# Patient Record
Sex: FEMALE | Race: ASIAN | Employment: UNEMPLOYED | URBAN - METROPOLITAN AREA
[De-identification: names, ages, dates, MRNs, and addresses within clinical notes are randomized per-mention and may not be internally consistent; named-entity substitution may affect disease eponyms.]

---

## 2019-05-23 ENCOUNTER — APPOINTMENT (OUTPATIENT)
Dept: MRI IMAGING | Age: 69
End: 2019-05-23
Attending: INTERNAL MEDICINE
Payer: MEDICARE

## 2019-05-23 ENCOUNTER — APPOINTMENT (OUTPATIENT)
Dept: VASCULAR SURGERY | Age: 69
End: 2019-05-23
Attending: INTERNAL MEDICINE
Payer: MEDICARE

## 2019-05-23 ENCOUNTER — HOSPITAL ENCOUNTER (OUTPATIENT)
Dept: MRI IMAGING | Age: 69
Discharge: HOME | End: 2019-05-23
Attending: INTERNAL MEDICINE
Payer: MEDICARE

## 2019-05-23 ENCOUNTER — APPOINTMENT (OUTPATIENT)
Dept: NON INVASIVE DIAGNOSTICS | Age: 69
End: 2019-05-23
Attending: INTERNAL MEDICINE
Payer: MEDICARE

## 2019-05-23 ENCOUNTER — APPOINTMENT (OUTPATIENT)
Dept: CT IMAGING | Age: 69
End: 2019-05-23
Attending: EMERGENCY MEDICINE
Payer: MEDICARE

## 2019-05-23 ENCOUNTER — HOSPITAL ENCOUNTER (OUTPATIENT)
Age: 69
Setting detail: OBSERVATION
Discharge: HOME | End: 2019-05-24
Attending: EMERGENCY MEDICINE | Admitting: INTERNAL MEDICINE
Payer: MEDICARE

## 2019-05-23 DIAGNOSIS — I16.0 HYPERTENSIVE URGENCY: Primary | ICD-10-CM

## 2019-05-23 DIAGNOSIS — G45.9 TIA (TRANSIENT ISCHEMIC ATTACK): ICD-10-CM

## 2019-05-23 PROBLEM — F32.A DEPRESSION: Status: ACTIVE | Noted: 2019-05-23

## 2019-05-23 PROBLEM — K21.9 GERD (GASTROESOPHAGEAL REFLUX DISEASE): Status: ACTIVE | Noted: 2019-05-23

## 2019-05-23 PROBLEM — I16.9 HYPERTENSIVE CRISIS: Status: ACTIVE | Noted: 2019-05-23

## 2019-05-23 PROBLEM — R47.89 WORD FINDING DIFFICULTY: Status: ACTIVE | Noted: 2019-05-23

## 2019-05-23 LAB
ALBUMIN SERPL-MCNC: 3.6 G/DL (ref 3.5–5)
ALBUMIN/GLOB SERPL: 0.8 {RATIO} (ref 1.1–2.2)
ALP SERPL-CCNC: 107 U/L (ref 45–117)
ALT SERPL-CCNC: 27 U/L (ref 12–78)
ANION GAP SERPL CALC-SCNC: 4 MMOL/L (ref 5–15)
APPEARANCE UR: CLEAR
AST SERPL-CCNC: 21 U/L (ref 15–37)
ATRIAL RATE: 83 BPM
BACTERIA URNS QL MICRO: NEGATIVE /HPF
BASOPHILS # BLD: 0 K/UL (ref 0–0.1)
BASOPHILS NFR BLD: 1 % (ref 0–1)
BILIRUB SERPL-MCNC: 0.3 MG/DL (ref 0.2–1)
BILIRUB UR QL: NEGATIVE
BUN SERPL-MCNC: 12 MG/DL (ref 6–20)
BUN/CREAT SERPL: 13 (ref 12–20)
CALCIUM SERPL-MCNC: 8.8 MG/DL (ref 8.5–10.1)
CALCULATED P AXIS, ECG09: 46 DEGREES
CALCULATED R AXIS, ECG10: -32 DEGREES
CALCULATED T AXIS, ECG11: 33 DEGREES
CHLORIDE SERPL-SCNC: 105 MMOL/L (ref 97–108)
CO2 SERPL-SCNC: 26 MMOL/L (ref 21–32)
COLOR UR: NORMAL
COMMENT, HOLDF: NORMAL
CREAT SERPL-MCNC: 0.91 MG/DL (ref 0.55–1.02)
DIAGNOSIS, 93000: NORMAL
DIFFERENTIAL METHOD BLD: NORMAL
ECHO AO ASC DIAM: 4.15 CM
ECHO AO ROOT DIAM: 3.24 CM
ECHO AV PEAK GRADIENT: 8 MMHG
ECHO AV PEAK VELOCITY: 141.78 CM/S
ECHO IVC SNIFF: 0.89 CM
ECHO LA AREA 4C: 12.2 CM2
ECHO LA MAJOR AXIS: 2.89 CM
ECHO LA TO AORTIC ROOT RATIO: 0.89
ECHO LA VOL 2C: 21.35 ML (ref 22–52)
ECHO LA VOL 4C: 24.57 ML (ref 22–52)
ECHO LA VOL BP: 25.6 ML (ref 22–52)
ECHO LA VOL/BSA BIPLANE: 13.63 ML/M2 (ref 16–28)
ECHO LA VOLUME INDEX A2C: 11.37 ML/M2 (ref 16–28)
ECHO LA VOLUME INDEX A4C: 13.08 ML/M2 (ref 16–28)
ECHO LV E' LATERAL VELOCITY: 7.07 CENTIMETER/SECOND
ECHO LV E' SEPTAL VELOCITY: 5.57 CENTIMETER/SECOND
ECHO LV INTERNAL DIMENSION DIASTOLIC: 3.82 CM (ref 3.9–5.3)
ECHO LV INTERNAL DIMENSION SYSTOLIC: 2.35 CM
ECHO LV IVSD: 1.1 CM (ref 0.6–0.9)
ECHO LV MASS 2D: 139.9 G (ref 67–162)
ECHO LV MASS INDEX 2D: 74.5 G/M2 (ref 43–95)
ECHO LV POSTERIOR WALL DIASTOLIC: 0.96 CM (ref 0.6–0.9)
ECHO LVOT DIAM: 1.99 CM
ECHO MV A VELOCITY: 71 CM/S
ECHO MV AREA PHT: 2.6 CM2
ECHO MV E DECELERATION TIME (DT): 292.7 MS
ECHO MV E VELOCITY: 48.26 CM/S
ECHO MV E/A RATIO: 0.68
ECHO MV PRESSURE HALF TIME (PHT): 84.9 MS
ECHO PV MAX VELOCITY: 58.06 CM/S
ECHO PV PEAK GRADIENT: 1.3 MMHG
ECHO RV INTERNAL DIMENSION: 3.13 CM
ECHO RV TAPSE: 1.36 CM (ref 1.5–2)
ECHO TV REGURGITANT MAX VELOCITY: 274.81 CM/S
ECHO TV REGURGITANT PEAK GRADIENT: 30.2 MMHG
EOSINOPHIL # BLD: 0.2 K/UL (ref 0–0.4)
EOSINOPHIL NFR BLD: 2 % (ref 0–7)
EPITH CASTS URNS QL MICRO: NORMAL /LPF
ERYTHROCYTE [DISTWIDTH] IN BLOOD BY AUTOMATED COUNT: 13 % (ref 11.5–14.5)
GLOBULIN SER CALC-MCNC: 4.7 G/DL (ref 2–4)
GLUCOSE BLD STRIP.AUTO-MCNC: 109 MG/DL (ref 65–100)
GLUCOSE SERPL-MCNC: 107 MG/DL (ref 65–100)
GLUCOSE UR STRIP.AUTO-MCNC: NEGATIVE MG/DL
HCT VFR BLD AUTO: 44.1 % (ref 35–47)
HGB BLD-MCNC: 14.8 G/DL (ref 11.5–16)
HGB UR QL STRIP: NEGATIVE
HYALINE CASTS URNS QL MICRO: NORMAL /LPF (ref 0–5)
IMM GRANULOCYTES # BLD AUTO: 0 K/UL (ref 0–0.04)
IMM GRANULOCYTES NFR BLD AUTO: 0 % (ref 0–0.5)
KETONES UR QL STRIP.AUTO: NEGATIVE MG/DL
LEUKOCYTE ESTERASE UR QL STRIP.AUTO: NEGATIVE
LYMPHOCYTES # BLD: 2 K/UL (ref 0.8–3.5)
LYMPHOCYTES NFR BLD: 31 % (ref 12–49)
MCH RBC QN AUTO: 29.9 PG (ref 26–34)
MCHC RBC AUTO-ENTMCNC: 33.6 G/DL (ref 30–36.5)
MCV RBC AUTO: 89.1 FL (ref 80–99)
MONOCYTES # BLD: 0.4 K/UL (ref 0–1)
MONOCYTES NFR BLD: 6 % (ref 5–13)
NEUTS SEG # BLD: 3.9 K/UL (ref 1.8–8)
NEUTS SEG NFR BLD: 60 % (ref 32–75)
NITRITE UR QL STRIP.AUTO: NEGATIVE
NRBC # BLD: 0 K/UL (ref 0–0.01)
NRBC BLD-RTO: 0 PER 100 WBC
P-R INTERVAL, ECG05: 162 MS
PH UR STRIP: 6 [PH] (ref 5–8)
PLATELET # BLD AUTO: 211 K/UL (ref 150–400)
PMV BLD AUTO: 9.8 FL (ref 8.9–12.9)
POTASSIUM SERPL-SCNC: 4.4 MMOL/L (ref 3.5–5.1)
PROT SERPL-MCNC: 8.3 G/DL (ref 6.4–8.2)
PROT UR STRIP-MCNC: NEGATIVE MG/DL
Q-T INTERVAL, ECG07: 388 MS
QRS DURATION, ECG06: 82 MS
QTC CALCULATION (BEZET), ECG08: 455 MS
RBC # BLD AUTO: 4.95 M/UL (ref 3.8–5.2)
RBC #/AREA URNS HPF: NORMAL /HPF (ref 0–5)
SAMPLES BEING HELD,HOLD: NORMAL
SERVICE CMNT-IMP: ABNORMAL
SODIUM SERPL-SCNC: 135 MMOL/L (ref 136–145)
SP GR UR REFRACTOMETRY: 1.01 (ref 1–1.03)
TROPONIN I SERPL-MCNC: <0.05 NG/ML
UR CULT HOLD, URHOLD: NORMAL
UROBILINOGEN UR QL STRIP.AUTO: 0.2 EU/DL (ref 0.2–1)
VENTRICULAR RATE, ECG03: 83 BPM
WBC # BLD AUTO: 6.5 K/UL (ref 3.6–11)
WBC URNS QL MICRO: NORMAL /HPF (ref 0–4)

## 2019-05-23 PROCEDURE — 96366 THER/PROPH/DIAG IV INF ADDON: CPT

## 2019-05-23 PROCEDURE — 99218 HC RM OBSERVATION: CPT

## 2019-05-23 PROCEDURE — 70450 CT HEAD/BRAIN W/O DYE: CPT

## 2019-05-23 PROCEDURE — 97530 THERAPEUTIC ACTIVITIES: CPT

## 2019-05-23 PROCEDURE — 74011000250 HC RX REV CODE- 250: Performed by: INTERNAL MEDICINE

## 2019-05-23 PROCEDURE — 93306 TTE W/DOPPLER COMPLETE: CPT

## 2019-05-23 PROCEDURE — 74011250637 HC RX REV CODE- 250/637: Performed by: EMERGENCY MEDICINE

## 2019-05-23 PROCEDURE — 82962 GLUCOSE BLOOD TEST: CPT

## 2019-05-23 PROCEDURE — 96365 THER/PROPH/DIAG IV INF INIT: CPT

## 2019-05-23 PROCEDURE — 70551 MRI BRAIN STEM W/O DYE: CPT

## 2019-05-23 PROCEDURE — 74011250636 HC RX REV CODE- 250/636: Performed by: EMERGENCY MEDICINE

## 2019-05-23 PROCEDURE — 93880 EXTRACRANIAL BILAT STUDY: CPT

## 2019-05-23 PROCEDURE — 97162 PT EVAL MOD COMPLEX 30 MIN: CPT

## 2019-05-23 PROCEDURE — 74011000258 HC RX REV CODE- 258: Performed by: EMERGENCY MEDICINE

## 2019-05-23 PROCEDURE — 84484 ASSAY OF TROPONIN QUANT: CPT

## 2019-05-23 PROCEDURE — 74011000250 HC RX REV CODE- 250: Performed by: EMERGENCY MEDICINE

## 2019-05-23 PROCEDURE — 74011250636 HC RX REV CODE- 250/636: Performed by: INTERNAL MEDICINE

## 2019-05-23 PROCEDURE — 96375 TX/PRO/DX INJ NEW DRUG ADDON: CPT

## 2019-05-23 PROCEDURE — 36415 COLL VENOUS BLD VENIPUNCTURE: CPT

## 2019-05-23 PROCEDURE — 96374 THER/PROPH/DIAG INJ IV PUSH: CPT

## 2019-05-23 PROCEDURE — 96372 THER/PROPH/DIAG INJ SC/IM: CPT

## 2019-05-23 PROCEDURE — 70544 MR ANGIOGRAPHY HEAD W/O DYE: CPT

## 2019-05-23 PROCEDURE — 80053 COMPREHEN METABOLIC PANEL: CPT

## 2019-05-23 PROCEDURE — 99285 EMERGENCY DEPT VISIT HI MDM: CPT

## 2019-05-23 PROCEDURE — 74011250637 HC RX REV CODE- 250/637: Performed by: INTERNAL MEDICINE

## 2019-05-23 PROCEDURE — 85025 COMPLETE CBC W/AUTO DIFF WBC: CPT

## 2019-05-23 PROCEDURE — 81001 URINALYSIS AUTO W/SCOPE: CPT

## 2019-05-23 PROCEDURE — 93005 ELECTROCARDIOGRAM TRACING: CPT

## 2019-05-23 PROCEDURE — 74011000258 HC RX REV CODE- 258: Performed by: INTERNAL MEDICINE

## 2019-05-23 RX ORDER — ENOXAPARIN SODIUM 100 MG/ML
40 INJECTION SUBCUTANEOUS EVERY 24 HOURS
Status: DISCONTINUED | OUTPATIENT
Start: 2019-05-23 | End: 2019-05-24 | Stop reason: HOSPADM

## 2019-05-23 RX ORDER — NIFEDIPINE 30 MG/1
TABLET, EXTENDED RELEASE ORAL
COMMUNITY
End: 2019-05-23

## 2019-05-23 RX ORDER — LABETALOL HCL 20 MG/4 ML
20 SYRINGE (ML) INTRAVENOUS
Status: COMPLETED | OUTPATIENT
Start: 2019-05-23 | End: 2019-05-23

## 2019-05-23 RX ORDER — NIFEDIPINE 60 MG/1
60 TABLET, EXTENDED RELEASE ORAL DAILY
Status: ON HOLD | COMMUNITY
End: 2019-05-24 | Stop reason: SDUPTHER

## 2019-05-23 RX ORDER — GUAIFENESIN 100 MG/5ML
325 LIQUID (ML) ORAL
Status: COMPLETED | OUTPATIENT
Start: 2019-05-23 | End: 2019-05-23

## 2019-05-23 RX ORDER — GUAIFENESIN 100 MG/5ML
81 LIQUID (ML) ORAL DAILY
Status: DISCONTINUED | OUTPATIENT
Start: 2019-05-24 | End: 2019-05-24 | Stop reason: HOSPADM

## 2019-05-23 RX ORDER — NALOXONE HYDROCHLORIDE 0.4 MG/ML
0.4 INJECTION, SOLUTION INTRAMUSCULAR; INTRAVENOUS; SUBCUTANEOUS AS NEEDED
Status: DISCONTINUED | OUTPATIENT
Start: 2019-05-23 | End: 2019-05-24 | Stop reason: HOSPADM

## 2019-05-23 RX ORDER — SODIUM CHLORIDE 0.9 % (FLUSH) 0.9 %
5-40 SYRINGE (ML) INJECTION AS NEEDED
Status: DISCONTINUED | OUTPATIENT
Start: 2019-05-23 | End: 2019-05-24 | Stop reason: HOSPADM

## 2019-05-23 RX ORDER — LORATADINE 10 MG/1
10 TABLET ORAL
Status: DISCONTINUED | OUTPATIENT
Start: 2019-05-23 | End: 2019-05-24 | Stop reason: HOSPADM

## 2019-05-23 RX ORDER — SODIUM CHLORIDE 0.9 % (FLUSH) 0.9 %
5-40 SYRINGE (ML) INJECTION EVERY 8 HOURS
Status: DISCONTINUED | OUTPATIENT
Start: 2019-05-23 | End: 2019-05-24 | Stop reason: HOSPADM

## 2019-05-23 RX ORDER — NIFEDIPINE 60 MG/1
60 TABLET, EXTENDED RELEASE ORAL DAILY
Status: DISCONTINUED | OUTPATIENT
Start: 2019-05-23 | End: 2019-05-24 | Stop reason: HOSPADM

## 2019-05-23 RX ORDER — LORATADINE 10 MG/1
10 TABLET ORAL
COMMUNITY

## 2019-05-23 RX ORDER — ACETAMINOPHEN 325 MG/1
650 TABLET ORAL
Status: DISCONTINUED | OUTPATIENT
Start: 2019-05-23 | End: 2019-05-24 | Stop reason: HOSPADM

## 2019-05-23 RX ORDER — NIFEDIPINE 60 MG/1
60 TABLET, EXTENDED RELEASE ORAL DAILY
Status: DISCONTINUED | OUTPATIENT
Start: 2019-05-24 | End: 2019-05-23

## 2019-05-23 RX ADMIN — Medication 10 ML: at 22:00

## 2019-05-23 RX ADMIN — ENOXAPARIN SODIUM 40 MG: 40 INJECTION SUBCUTANEOUS at 17:45

## 2019-05-23 RX ADMIN — ASPIRIN 81 MG 324 MG: 81 TABLET ORAL at 11:38

## 2019-05-23 RX ADMIN — Medication 10 ML: at 14:49

## 2019-05-23 RX ADMIN — SODIUM CHLORIDE 0.5 MG/MIN: 900 INJECTION, SOLUTION INTRAVENOUS at 13:20

## 2019-05-23 RX ADMIN — LABETALOL 20 MG/4 ML (5 MG/ML) INTRAVENOUS SYRINGE 20 MG: at 11:39

## 2019-05-23 RX ADMIN — NIFEDIPINE 60 MG: 60 TABLET, FILM COATED, EXTENDED RELEASE ORAL at 15:03

## 2019-05-23 NOTE — PROGRESS NOTES
Problem: Mobility Impaired (Adult and Pediatric)  Goal: *Acute Goals and Plan of Care (Insert Text)  Description  Physical Therapy Goals  Initiated 5/23/2019  1. Patient will move from supine to sit and sit to supine  and scoot up and down in bed with modified independence within 7 day(s). 2.  Patient will transfer from bed to chair and chair to bed with modified independence using the least restrictive device within 7 day(s). 3.  Patient will perform sit to stand with modified independence within 7 day(s). 4.  Patient will ambulate with modified independence for 150 feet with the least restrictive device within 7 day(s). 5.  Patient will ascend/descend 2 stairs with handrail(s) with modified independence within 7 day(s). Outcome: Progressing Towards Goal    PHYSICAL THERAPY EVALUATION  Patient: Madeleine Campoverde (70 y.o. female)  Date: 5/23/2019  Primary Diagnosis: Hypertensive crisis [I16.9]        Precautions: Falls per history       ASSESSMENT :  Based on the objective data described below, the patient presents with generally decreased strength, elevated BP with minimal activity, headache with activity that resolves with rest, and limited functional mobility on day of admission with hypertensive urgency. Pt reports independent baseline mobility with positive fall history although offers only vague details regarding events surrounding falls. She offers good participation with PT and requires SBA for transfers and very brief ambulation to reposition in bed. BP elevated with very little activity and she presents with concurrent headache. RN aware of pt presentation and symptoms. Patient will benefit from skilled intervention to address the above impairments. Patient?s rehabilitation potential is considered to be Good  Factors which may influence rehabilitation potential include:   ? None noted  ? Mental ability/status  ? Medical condition  ?          Home/family situation and support systems  ? Safety awareness  ? Pain tolerance/management  ? Other:      PLAN :  Recommendations and Planned Interventions:  ?           Bed Mobility Training             ? Neuromuscular Re-Education  ? Transfer Training                   ? Orthotic/Prosthetic Training  ? Gait Training                         ? Modalities  ? Therapeutic Exercises           ? Edema Management/Control  ? Therapeutic Activities            ? Patient and Family Training/Education  ? Other (comment):    Frequency/Duration: Patient will be followed by physical therapy  5 times a week to address goals. Discharge Recommendations: Home Health vs none  Further Equipment Recommendations for Discharge: none at this time; TBD with further gait activities. SUBJECTIVE:   Patient stated ? I like to garden. ?    Pt received supine, agreeable to PT and cleared by RN. OBJECTIVE DATA SUMMARY:   HISTORY:    Past Medical History:   Diagnosis Date    Depression    History reviewed. No pertinent surgical history. Prior Level of Function/Home Situation: states independent ambulation at baseline  Personal factors and/or comorbidities impacting plan of care: as above. Home Situation  Home Environment: Private residence  # Steps to Enter: 2  Rails to Enter: Yes  Hand Rails : Bilateral  One/Two Story Residence: One story  Living Alone: No  Support Systems: Spouse/Significant Other/Partner  Patient Expects to be Discharged to[de-identified] Private residence  Current DME Used/Available at Home: None    EXAMINATION/PRESENTATION/DECISION MAKING:   Critical Behavior:  Neurologic State: Alert, Eyes open spontaneously  Orientation Level: Oriented X4  Cognition: Follows commands, Appropriate decision making, Appropriate for age attention/concentration, Appropriate safety awareness     Hearing:   Auditory  Auditory Impairment: None  Skin:  LE exposed skin intact  Edema: none noted LEs. Range Of Motion:  AROM: Within functional limits                       Strength:    Strength: Generally decreased, functional                    Tone & Sensation:   Tone: Normal              Sensation: Intact               Coordination:  Coordination: Within functional limits  Vision:      Functional Mobility:  Bed Mobility:     Supine to Sit: Modified independent  Sit to Supine: Modified independent  Scooting: Modified independent  Transfers:  Sit to Stand: Stand-by assistance  Stand to Sit: Stand-by assistance                       Balance:   Sitting: Without support  Sitting - Static: Good (unsupported)  Sitting - Dynamic: Good (unsupported)  Standing: With support  Standing - Static: Good  Standing - Dynamic : Fair  Ambulation/Gait Training:  Distance (ft): 2 Feet (ft)  Assistive Device: Gait belt  Ambulation - Level of Assistance: Stand-by assistance                                                           Functional Measure:  Barthel Index:    Bathin  Bladder: 10  Bowels: 10  Groomin  Dressing: 10  Feeding: 10  Mobility: 0  Stairs: 0  Toilet Use: 5  Transfer (Bed to Chair and Back): 10  Total: 60/100       Percentage of impairment   0%   1-19%   20-39%   40-59%   60-79%   80-99%   100%   Barthel Score 0-100 100 99-80 79-60 59-40 20-39 1-19   0     The Barthel ADL Index: Guidelines  1. The index should be used as a record of what a patient does, not as a record of what a patient could do. 2. The main aim is to establish degree of independence from any help, physical or verbal, however minor and for whatever reason. 3. The need for supervision renders the patient not independent. 4. A patient's performance should be established using the best available evidence. Asking the patient, friends/relatives and nurses are the usual sources, but direct observation and common sense are also important. However direct testing is not needed.   5. Usually the patient's performance over the preceding 24-48 hours is important, but occasionally longer periods will be relevant. 6. Middle categories imply that the patient supplies over 50 per cent of the effort. 7. Use of aids to be independent is allowed. Carolina Stiles., Barthel, D.W. (7994). Functional evaluation: the Barthel Index. [address] [facility] (14)2. WILLAM Monroe, Zenon Bhagat., [facility] [place], [address] [place] [address] (1999). Measuring the change indisability after inpatient rehabilitation; comparison of the responsiveness of the Barthel Index and Functional Lynn Measure. Journal of Neurology, Neurosurgery, and Psychiatry, 66(4), 845-291. Clarence Cruz, N.J.A, SUKUMAR Gaona, & Francie Agudelo M.A. (2004.) Assessment of post-stroke quality of life in cost-effectiveness studies: The usefulness of the Barthel Index and the EuroQoL-5D. Quality of Life Research, 15, 206-83            Physical Therapy Evaluation Charge Determination   History Examination Presentation Decision-Making   HIGH Complexity :3+ comorbidities / personal factors will impact the outcome/ POC  HIGH Complexity : 4+ Standardized tests and measures addressing body structure, function, activity limitation and / or participation in recreation  MEDIUM Complexity : Evolving with changing characteristics  Other outcome measures barthel  MEDIUM      Based on the above components, the patient evaluation is determined to be of the following complexity level: MEDIUM    Pain:  Pain Scale 1: Numeric (0 - 10)  Pain Intensity 1: 0              Activity Tolerance:   Limited by headache and elevated BP. Please refer to the flowsheet for vital signs taken during this treatment. After treatment:   ?         Patient left in no apparent distress sitting up in chair  ? Patient left in no apparent distress in bed  ? Call bell left within reach  ? Nursing notified  ? Caregiver present  ?          Bed alarm activated    COMMUNICATION/EDUCATION:   The patient?s plan of care was discussed with: Registered Nurse. ?         Fall prevention education was provided and the patient/caregiver indicated understanding. ? Patient/family have participated as able in goal setting and plan of care. ?         Patient/family agree to work toward stated goals and plan of care. ?         Patient understands intent and goals of therapy, but is neutral about his/her participation. ? Patient is unable to participate in goal setting and plan of care.     Thank you for this referral.  Marylu Breaux, PT, DPT   Time Calculation: 21 mins

## 2019-05-23 NOTE — PROGRESS NOTES
Observation letters reviewed with pt,signed by pt,placed in pt,'s chart to be scanned with copies given to pt.   Nathan Natarajan

## 2019-05-23 NOTE — ED PROVIDER NOTES
70 y.o. female with past medical history significant for HTN and depression who presents to the ED via EMS with chief complaint of speech difficulty. Pt reports this morning at approximately 0900 she had speech difficulty, says she \"had problems pronouncing words correctly. \" Pt states her speech difficulty lasted for a few minutes and then resolved. Pt states she also had a headache last night that is now resolved and her blood pressure has been elevated. Pt states she has been taking her antihypertensive medication (nifedipine) as prescribed but has not yet taken it this morning. Upon arrival to the ED, pt's blood pressure is noted to be elevated at 248/128 and she is VAN negative. Pt states she has hx of similar sx \"a long time ago. \" Pt denies weakness or gait problems. There are no other acute medical complaints voiced at this time. Social Hx: Never smoker. Denies EtOH or drug use. PCP: No primary care provider on file. Note written by Dannie Piedra, as dictated by Valentine Ignacio MD 10:50 AM     The history is provided by the patient and the EMS personnel. No  was used. 67y F here with elevated BP. Came in by EMS. Home health care nurse checked it at home and it was high. Hasn't taken meds this morning because she didn't eat breakfast. Denies any symptoms. No focal weakness or numbness. No chest pain. No trouble breathing. Reports having trouble with her speech that started sometime between 9a-10a today. Pt made a level 1 code stroke from triage when I evaluated her. Past Medical History:   Diagnosis Date    Depression        No past surgical history on file.       Family History:   Problem Relation Age of Onset    Heart Disease Father     Heart Disease Mother     Heart Disease Brother        Social History     Socioeconomic History    Marital status: UNKNOWN     Spouse name: Not on file    Number of children: Not on file    Years of education: Not on file    Highest education level: Not on file   Occupational History    Not on file   Social Needs    Financial resource strain: Not on file    Food insecurity:     Worry: Not on file     Inability: Not on file    Transportation needs:     Medical: Not on file     Non-medical: Not on file   Tobacco Use    Smoking status: Never Smoker    Smokeless tobacco: Never Used   Substance and Sexual Activity    Alcohol use: No    Drug use: No    Sexual activity: Not on file     Comment:  & has 3 grown children   Lifestyle    Physical activity:     Days per week: Not on file     Minutes per session: Not on file    Stress: Not on file   Relationships    Social connections:     Talks on phone: Not on file     Gets together: Not on file     Attends [employer] service: Not on file     Active member of club or organization: Not on file     Attends meetings of clubs or organizations: Not on file     Relationship status: Not on file    Intimate partner violence:     Fear of current or ex partner: Not on file     Emotionally abused: Not on file     Physically abused: Not on file     Forced sexual activity: Not on file   Other Topics Concern    Not on file   Social History Narrative    Not on file         ALLERGIES: Patient has no known allergies. Review of Systems   Constitutional: Negative for fever. Eyes: Negative for visual disturbance. Respiratory: Negative for cough, shortness of breath and wheezing. Cardiovascular: Negative for chest pain and leg swelling. Gastrointestinal: Negative for abdominal pain, diarrhea, nausea and vomiting. Genitourinary: Negative for dysuria. Musculoskeletal: Negative. Negative for back pain, gait problem and neck stiffness. Skin: Negative for rash. Neurological: Positive for speech difficulty and headaches (resolved). Negative for syncope and weakness. Psychiatric/Behavioral: Negative for confusion. All other systems reviewed and are negative.       Vitals: 05/23/19 1043 05/23/19 1049   BP: (!) 248/128    Pulse: 91    Resp: 16    Temp: 98.6 °F (37 °C)    SpO2: 97%    Weight: 80.7 kg (178 lb) 80.5 kg (177 lb 7.5 oz)            Physical Exam   Constitutional: She is oriented to person, place, and time. She appears well-developed and well-nourished. No distress. HENT:   Head: Normocephalic. Mouth/Throat: Oropharynx is clear and moist.   Eyes: Pupils are equal, round, and reactive to light. Neck: Normal range of motion. Neck supple. Cardiovascular: Normal rate, regular rhythm and normal heart sounds. No murmur heard. Pulmonary/Chest: Effort normal and breath sounds normal.   Abdominal: Soft. There is no tenderness. Musculoskeletal: Normal range of motion. Neurological: She is alert and oriented to person, place, and time. No cranial nerve deficit or sensory deficit. Coordination normal.   Skin: Skin is warm and dry. Capillary refill takes less than 2 seconds. Psychiatric: She has a normal mood and affect. Her behavior is normal.   Nursing note and vitals reviewed. Note written by Dannie Duke, as dictated by Marlon Sanchez MD 10:51 AM    MDM  Number of Diagnoses or Management Options  Hypertensive urgency:   TIA (transient ischemic attack):      Amount and/or Complexity of Data Reviewed  Clinical lab tests: ordered and reviewed  Obtain history from someone other than the patient: yes  Discuss the patient with other providers: yes  Independent visualization of images, tracings, or specimens: yes    Risk of Complications, Morbidity, and/or Mortality  Presenting problems: high  Diagnostic procedures: high  Management options: high    Critical Care  Total time providing critical care: 30-74 minutes         Procedures    CONSULT NOTE:  11:00 AM Marlon Sanchez MD spoke with Dr. Carlos Dangelo, Consult for Teleneurology. Discussed available diagnostic tests and clinical findings. Dr. Carlos Dangelo will see pt.      ED EKG interpretation:  Rhythm: normal sinus rhythm; and regular . Rate (approx.): 74; Axis: normal; ST/T wave: No acute changes.      Note written by Dannie Burgess, as dictated by Danay Suero MD 11:32 AM       Spoke c dr. Nima Beckford. He will adm

## 2019-05-23 NOTE — PROGRESS NOTES
[facility]: MED RECONCILIATION      Medications removed:  Lisinopril 20mg daily  Omeprazole 20mg daily    Medications adjusted:  Nifedipine- previously 30mg daily. This has not been filled since Nov 2018    Information obtained from: Patient, Karen Walden       Allergies: Patient has no known allergies. Prior to Admission Medications:     Medication Documentation Review Audit       Reviewed by Olinda Antunez, PHARMD (Pharmacist) on 05/23/19 at 1228      Medication Sig Documenting Provider Last Dose Status Taking?   aspirin 81 mg chewable tablet Take 81 mg by mouth daily. Provider, Historical  Active Yes   loratadine (CLARITIN) 10 mg tablet Take 10 mg by mouth daily as needed. Other, MD Mariel  Active Yes   NIFEdipine ER (PROCARDIA XL) 60 mg ER tablet Take 60 mg by mouth daily. Provider, Historical  Active Yes           Med Note (Heidi Cartagena. Thu May 23, 2019 12:26 PM) Last filled Nov 2018 for 30 tabs. Pt is not adherent                    Juanito Melgar
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
## 2019-05-23 NOTE — ED NOTES
Dr. Ryan Kendrick paged and notified of decreased BP, 148/79. Labetalol drip stopped per order. Per Dr. Ryan Kendrick, continue to watch in Step down unit. No further orders or changes at this time.

## 2019-05-23 NOTE — PROGRESS NOTES
Patient passed STAND. Will f/u once she arrives in ICU.  1446: spoke with RN. Currently focus on HTN. She passed the STAND and has no c/o speech issues. Ok for diet per SLP. SLP will f/u in am for medical stability.

## 2019-05-23 NOTE — CONSULTS
RACHEL SECOURS: [id number] [address] Neurology  [place] [address]  [phone]      Name:   Anival Shepherd record #: [id number]  Admission Date: 5/23/2019     Who Consulted: Dr. Tien Weinberg    Reason for Consult:  Slurred Speech    HISTORY OF PRESENT ILLNESS:     This is a 70 y.o. female who is admitted for Slurred Speech. The Neurology Service is asked to evaluate for stroke versus seizure. Ms. Sandro Law presented to the ED after having some trouble with word finding earlier in the day and her care taker found her to be hypertensive. Upon admission she was stroke coded but did not receive TPA as her symptoms had resolved. Her admission Bp was 248/128. Neuro-imaging:     CT Head:  No acute process    EKG: normal EKG, normal sinus rhythm. Care Plan discussed with:  Patient x   Family    RN    Care Manager    Consultant/Specialist:         Thank you for allowing the Neurology Service the pleasure of participating in the care of your patient. This patient will be discussed with my collaborating care team physician,  Dr. True Floyd and he may have further recommendations regarding this patient's care      Genoveva Toribio, ACNP-BC  ====================      Attending Attestation:       I have reviewed the documentation provided by the nurse practitioner, Mrs. Redd Toribio, and we have discussed her findings and the clinical impression. I have formulated with her the proposed management plans for this patient. Additionally,  I have personally evaluated the patient to verify the history and to confirm physical findings. Below are my additional comments:    70-year-old lady admitted to the hospital with slurred speech question stroke and markedly elevated blood pressure on arrival.  MRI of the brain is been performed and shows no acute stroke. MRA of the intracranial circulation however demonstrates questionable significant intracranial stenosis. She is headed to CTA at this point. She has had resolution of symptoms. She denies any recurrent symptoms. On exam she looks well. She is awake alert oriented and conversant. Speech and language are normal.  Cranial nerves intact 2-12. She has no pronation or drift. Strength is full throughout. No ataxia. Question TIA versus symptoms secondary to hypertension. Question significant intracranial stenosis versus artifact. Await CTA. Continue aspirin for now. Continue good blood pressure control. If she does have significant intracranial stenosis would start her on dual antiplatelet therapy as well as optimize lipids and ensuring that her LDL is less than 70 and have her follow-up as an outpatient with Dr. Farzad Arce or Dr. Angel Trujillo MD                         Assessment/Plan:     1. Stroke vs PRES:    · ASA 81 mg  · Will need ASA at discharge  · Neurochecks:  Every 4 hours  · Blood Sugar Goal:  140-180  · BP Goal: SBP should be 180-200 for next 24 hours as admission Bp was approx 200. Do not lower it more than 180 with labetalol, however if it should drift lower on its own do not need to augment. · Telemetry for at least 24 hours    Stroke work up in progress  · A1C:  · LDL:    · TTE:    · MRI:  · Carotid vascular imaging:    Risk factors for stroke include: HTN, physical inactivity  · Discussed with patient    · Discussed signs/symptoms of stroke and when to call 911    3. Mobility:   · Has not been OOB. · PT/OT to eval for rehab    4. Diet:    · Does not need SLP, passed STAND    5. VTE Prophylaxes:   · Per Primary team           Review of Systems: 10 point ROS was performed. Pertinent positives listed in HPI. Negative ROS is as follows. Pt denies: angina, palpitations, paresthesias, weakness, vision loss, slurred speech, aphasia, confusion, fever, chills, falls, diplopia, back pain, neck pain, prior episodes of vertigo, hallucinations, new medications or dosage changes.       Physical Exam    General:   Alert, cooperative, no acute distress. Lungs:   Clear to auscultation bilaterally. No crackles/wheezes. Heart:  Abdominal:  Normal rhythm, no carotid bruits, no peripheral edema  Soft and nondistended   NEUROLOGICAL EXAM:     Mental Status: Oriented to time, place and person. Fully attentive. No aphasia. Full fund of knowledge. Normal recent and remote memory. Cranial Nerves:   Visual Fields:  normal in all quadrants in both eyes. EOM: no nystagmus. Facial movements:  symmetric, no ptosis Facial sensation:  intact to LT on both sides. Hearing:  normal.       Language:  no dysarthria, no aphasia, normal fluency, normal repetition. Tongue: midline. Soft palate: not examined  SCMs: normal, symmetric. Reflexes:   LUExt: 2+/ 4                 RUExt: 2+/ 4  LLExt: 2+/4                  RLExt: 2+/ 4          Sensory:   LT and Temp intact in all extremities            Cerebellar:  No resting, no postural tremors, normal finger nose finger. No pronator drift                            Motor:           LUExt: 5/ 5               RUExt: 5/ 5                                              LLExt: 5/ 5                RLExt: 5/ 5        Gait:   Not tested              Allergies:   No Known Allergies    Outpatient Meds  No current facility-administered medications on file prior to encounter. Current Outpatient Medications on File Prior to Encounter   Medication Sig Dispense Refill    loratadine (CLARITIN) 10 mg tablet Take 10 mg by mouth daily as needed.  NIFEdipine ER (PROCARDIA XL) 60 mg ER tablet Take 60 mg by mouth daily.  aspirin 81 mg chewable tablet Take 81 mg by mouth daily.          Inpatient Meds    Current Facility-Administered Medications   Medication Dose Route Frequency Provider Last Rate Last Dose    sodium chloride (NS) flush 5-40 mL  5-40 mL IntraVENous Q8H Eloise Munoz MD   10 mL at 05/23/19 1449    sodium chloride (NS) flush 5-40 mL  5-40 mL IntraVENous PRN MD Eder Palomo acetaminophen (TYLENOL) tablet 650 mg  650 mg Oral Q6H PRN Aris Short MD        naloxone [facility]) injection 0.4 mg  0.4 mg IntraVENous PRN Aris Short MD        [START ON 5/24/2019] aspirin chewable tablet 81 mg  81 mg Oral DAILY Aris Short MD        loratadine (CLARITIN) tablet 10 mg  10 mg Oral DAILY PRN Aris Short MD        labetalol (NORMODYNE;TRANDATE) 300 mg in 0.9% sodium chloride 150 mL (2 mg / 1 mL) infusion  0.5-2 mg/min IntraVENous TITRATE Aris Short MD 15 mL/hr at 05/23/19 1501 0.5 mg/min at 05/23/19 1501    NIFEdipine ER (PROCARDIA XL) tablet 60 mg  60 mg Oral DAILY Aris Short MD   60 mg at 05/23/19 1503    enoxaparin (LOVENOX) injection 40 mg  40 mg SubCUTAneous Q24H Aris Short MD               Past Medical History:   Diagnosis Date    Depression        History reviewed. No pertinent surgical history. family history includes Heart Disease in her brother, father, and mother. reports that she has never smoked. She has never used smokeless tobacco. She reports that she does not drink alcohol or use drugs. Lab Results (last 24 hrs)  Recent Results (from the past 24 hour(s))   GLUCOSE, POC    Collection Time: 05/23/19 10:48 AM   Result Value Ref Range    Glucose (POC) 109 (H) 65 - 100 mg/dL    Performed by Mariama Robles    CBC WITH AUTOMATED DIFF    Collection Time: 05/23/19 11:16 AM   Result Value Ref Range    WBC 6.5 3.6 - 11.0 K/uL    RBC 4.95 3.80 - 5.20 M/uL    HGB 14.8 11.5 - 16.0 g/dL    HCT 44.1 35.0 - 47.0 %    MCV 89.1 80.0 - 99.0 FL    MCH 29.9 26.0 - 34.0 PG    MCHC 33.6 30.0 - 36.5 g/dL    RDW 13.0 11.5 - 14.5 %    PLATELET 896 597 - 770 K/uL    MPV 9.8 8.9 - 12.9 FL    NRBC 0.0 0  WBC    ABSOLUTE NRBC 0.00 0.00 - 0.01 K/uL    NEUTROPHILS 60 32 - 75 %    LYMPHOCYTES 31 12 - 49 %    MONOCYTES 6 5 - 13 %    EOSINOPHILS 2 0 - 7 %    BASOPHILS 1 0 - 1 %    IMMATURE GRANULOCYTES 0 0.0 - 0.5 %    ABS. NEUTROPHILS 3.9 1.8 - 8.0 K/UL    ABS. LYMPHOCYTES 2.0 0.8 - 3.5 K/UL    ABS. MONOCYTES 0.4 0.0 - 1.0 K/UL    ABS. EOSINOPHILS 0.2 0.0 - 0.4 K/UL    ABS. BASOPHILS 0.0 0.0 - 0.1 K/UL    ABS. IMM. GRANS. 0.0 0.00 - 0.04 K/UL    DF AUTOMATED     METABOLIC PANEL, COMPREHENSIVE    Collection Time: 05/23/19 11:16 AM   Result Value Ref Range    Sodium 135 (L) 136 - 145 mmol/L    Potassium 4.4 3.5 - 5.1 mmol/L    Chloride 105 97 - 108 mmol/L    CO2 26 21 - 32 mmol/L    Anion gap 4 (L) 5 - 15 mmol/L    Glucose 107 (H) 65 - 100 mg/dL    BUN 12 6 - 20 MG/DL    Creatinine 0.91 0.55 - 1.02 MG/DL    BUN/Creatinine ratio 13 12 - 20      GFR est AA >60 >60 ml/min/1.73m2    GFR est non-AA >60 >60 ml/min/1.73m2    Calcium 8.8 8.5 - 10.1 MG/DL    Bilirubin, total 0.3 0.2 - 1.0 MG/DL    ALT (SGPT) 27 12 - 78 U/L    AST (SGOT) 21 15 - 37 U/L    Alk. phosphatase 107 45 - 117 U/L    Protein, total 8.3 (H) 6.4 - 8.2 g/dL    Albumin 3.6 3.5 - 5.0 g/dL    Globulin 4.7 (H) 2.0 - 4.0 g/dL    A-G Ratio 0.8 (L) 1.1 - 2.2     SAMPLES BEING HELD    Collection Time: 05/23/19 11:16 AM   Result Value Ref Range    SAMPLES BEING HELD 1RED,1BL,1SST     COMMENT        Add-on orders for these samples will be processed based on acceptable specimen integrity and analyte stability, which may vary by analyte.    TROPONIN I    Collection Time: 05/23/19 11:16 AM   Result Value Ref Range    Troponin-I, Qt. <0.05 <0.05 ng/mL   EKG, 12 LEAD, INITIAL    Collection Time: 05/23/19 11:32 AM   Result Value Ref Range    Ventricular Rate 83 BPM    Atrial Rate 83 BPM    P-R Interval 162 ms    QRS Duration 82 ms    Q-T Interval 388 ms    QTC Calculation (Bezet) 455 ms    Calculated P Axis 46 degrees    Calculated R Axis -32 degrees    Calculated T Axis 33 degrees    Diagnosis       Normal sinus rhythm  Left axis deviation  Minimal voltage criteria for LVH, may be normal variant  Abnormal ECG  No previous ECGs available     URINALYSIS W/MICROSCOPIC    Collection Time: 05/23/19 12:12 PM   Result Value Ref Range Color YELLOW/STRAW      Appearance CLEAR CLEAR      Specific gravity 1.006 1.003 - 1.030      pH (UA) 6.0 5.0 - 8.0      Protein NEGATIVE  NEG mg/dL    Glucose NEGATIVE  NEG mg/dL    Ketone NEGATIVE  NEG mg/dL    Bilirubin NEGATIVE  NEG      Blood NEGATIVE  NEG      Urobilinogen 0.2 0.2 - 1.0 EU/dL    Nitrites NEGATIVE  NEG      Leukocyte Esterase NEGATIVE  NEG      WBC 0-4 0 - 4 /hpf    RBC 0-5 0 - 5 /hpf    Epithelial cells FEW FEW /lpf    Bacteria NEGATIVE  NEG /hpf    Hyaline cast 0-2 0 - 5 /lpf   URINE CULTURE HOLD SAMPLE    Collection Time: 05/23/19 12:12 PM   Result Value Ref Range    Urine culture hold        URINE ON HOLD IN MICROBIOLOGY DEPT FOR 3 DAYS.  IF UNPRESERVED URINE IS SUBMITTED, IT CANNOT BE USED FOR ADDITIONAL TESTING AFTER 24 HRS, RECOLLECTION WILL BE REQUIRED.   ECHO ADULT COMPLETE    Collection Time: 05/23/19  2:45 PM   Result Value Ref Range    LA Volume 25.6 22 - 52 mL    LV E' Lateral Velocity 7.07 centimeter/second    LV E' Septal Velocity 5.57 centimeter/second    Tapse 1.36 (A) 1.5 - 2.0 cm    Ao Root D 3.24 cm    AO ASC D 4.15 cm    Aortic Valve Systolic Peak Velocity 776.70 cm/s    AoV PG 8.0 mmHg    LVIDd 3.82 (A) 3.9 - 5.3 cm    LVPWd 0.96 (A) 0.6 - 0.9 cm    LVIDs 2.35 cm    IVSd 1.10 (A) 0.6 - 0.9 cm    LVOT d 1.99 cm    MVA (PHT) 2.6 cm2    MV A Jesús 71.00 cm/s    MV E Jesús 48.26 cm/s    MV E/A 0.68     Left Atrium to Aortic Root Ratio 0.89     RVIDd 3.13 cm    Inferior Vena Cava Diameter Sniffing 0.89 cm    LA Vol 4C 24.57 22 - 52 mL    LA Vol 2C 21.35 (A) 22 - 52 mL    LA Area 4C 12.2 cm2    LV Mass .9 67 - 162 g    LV Mass AL Index 74.5 43 - 95 g/m2    Mitral Valve E Wave Deceleration Time 292.7 ms    Mitral Valve Pressure Half-time 84.9 ms    Left Atrium Major Axis 2.89 cm    Triscuspid Valve Regurgitation Peak Gradient 30.2 mmHg    Pulmonic Valve Max Velocity 58.06 cm/s    TR Max Velocity 274.81 cm/s    LA Vol Index 13.63 16 - 28 ml/m2    LA Vol Index 11.37 16 - 28 ml/m2    LA Vol Index 13.08 16 - 28 ml/m2    PV peak gradient 1.3 mmHg

## 2019-05-23 NOTE — ED NOTES
Echo at bedside. Pt resting on right side. Pt appears sleepy, but easily arousable and answers questions appropriately.

## 2019-05-23 NOTE — ED NOTES
[facility] [place] called to request forensic nurse to return call to ED.

## 2019-05-23 NOTE — H&P
[address]  [phone]    Hospitalist Admission Note      NAME:  Maritza Medrano   :   1950   MRN:  [id number]     PCP:  Jessica Conti MD     Date/Time:  2019 2:47 PM         Assessment / Plan:         Hypertensive crisis: pt does report word finding difficulty earlier today, and having speech difficulties. Her symptoms seemed to have resolved in the ED. Possible TIA, so will complete TIA/CVA workup to include MRI/MRA, carotid dopplers, echo with bubble. Start on IV labetalol but keeping in mind permissive HTN. Resume home nifedipine. Frequent neuro checks. Neurology evaluation. Check FLP and A1c for risk stratification       Depression: not apparently on antidepressants. Follow up outpatient    Code Status: FULL     Surrogate decision maker: spouse      ED notes and lab results reviewed. Total time spent with patient: 50 Minutes   Time spent in the care of this patient included reviewing records, discussing with nursing, obtaining history and examining the patient, and discussing treatment plans, with >50% time spent counseling/coordinating care    Risk of deterioration: High                 Care Plan discussed with: ED provider, Patient, Nursing Staff and >50% of time spent in counseling and coordination of care    Discussed:  Care Plan and D/C Planning    Prophylaxis:  Lovenox    Disposition:  Home w/Family                 Subjective:     CHIEF COMPLAINT: elevated blood pressure    HISTORY OF PRESENT ILLNESS:     Ms. Tita Dawson is a 70 y.o. female w/ hx of HTN, depression who presents with elevated blood pressure. Came to ED because her BP was high at home. Also noted around 9am, pt had trouble with her speech, specifically trouble with word-finding. Her symptoms have resolved since then. She did not have a HA, dizziness, visual changes, facial droop (as far as she can tell), weakness, numbness, confusion.  No CP or SOB. ED workup showed markedly elevated BP (248/128). Head CT showed no acute process. Pt was seen by teleneurology who recommended admission for observation. Ms. Alina Gamble is admitted for further evaluation and management. Past Medical History:   Diagnosis Date    Depression         History reviewed. No pertinent surgical history. Social History     Tobacco Use    Smoking status: Never Smoker    Smokeless tobacco: Never Used   Substance Use Topics    Alcohol use: No        Family History   Problem Relation Age of Onset    Heart Disease Father     Heart Disease Mother     Heart Disease Brother         No Known Allergies     Prior to Admission medications    Medication Sig Start Date End Date Taking? Authorizing Provider   loratadine (CLARITIN) 10 mg tablet Take 10 mg by mouth daily as needed. Yes Other, MD Mariel   NIFEdipine ER (PROCARDIA XL) 60 mg ER tablet Take 60 mg by mouth daily. Yes Provider, Historical   aspirin 81 mg chewable tablet Take 81 mg by mouth daily. Yes Provider, Historical       Review of Systems:  (bold if positive, if negative)    Gen:  Eyes:  ENT:  CVS:  Pulm:  GI:    :    MS:  Skin:  Psych:  Endo:    Hem:  Renal:    Neuro:   trouble with word-finding     Objective:      VITALS:    Vital signs reviewed; most recent are:    Visit Vitals  /77   Pulse 74   Temp 98.6 °F (37 °C)   Resp 10   Ht 5' 5\" (1.651 m)   Wt 80.3 kg (177 lb)   SpO2 96%   BMI 29.45 kg/m²     SpO2 Readings from Last 6 Encounters:   05/23/19 96%   01/13/15 98%        No intake or output data in the 24 hours ending 05/23/19 1447         Exam:     Physical Exam:    Gen:  Well-developed, well-nourished, in no acute distress  HEENT:  No scleral icterus, PERRL, hearing intact to voice, moist mucous membranes  Neck:  Supple, without masses. Resp:  No accessory muscle use. CTAB without wheezing, rales, rhonchi  Card: RRR. Normal S1 and S2 without murmurs, rubs, or gallops.   No peripheral lower extremity edema. No JVD. Peripheral pulses in tact. Abd:  Normoactive bowel sounds. Soft, non-tender, non-distended. No rebound, no guarding. No appreciable hepatosplenomegaly   Musc:  No cyanosis or clubbing  Skin:  No rashes or ulcers; turgor intact. Neuro:  Cranial nerves 3-12 intact, no focal motor weakness showing 5/5 strength BUE and BLEs, prox and distal, follows commands appropriately. Psych:  Good insight, normal affect. Alert, oriented x 3. Answers questions appropriately however a little slow in answering       Labs:    Recent Labs     05/23/19  1116   WBC 6.5   HGB 14.8   HCT 44.1        Recent Labs     05/23/19  1116   *   K 4.4      CO2 26   *   BUN 12   CREA 0.91   CA 8.8   ALB 3.6   SGOT 21   ALT 27     No components found for: GLPOC  No results for input(s): PH, PCO2, PO2, HCO3, FIO2 in the last 72 hours. No results for input(s): INR in the last 72 hours. No lab exists for component: INREXT  No results found for: SDES  No results found for: CULT  All other current labs reviewed in the computer.       Imaging/Studies:    Head CT:  No evidence for acute intracranial abnormality    EKG: NSR, no overt ST-T changes  EKG personally reviewed    ___________________________________________________    Attending Physician: Kee Tirado MD

## 2019-05-23 NOTE — PROGRESS NOTES
Reason for Admission:   Hypertensive crisis                   RRAT Score:          5           Plan for utilizing home health: To be determiined                    Current Advanced Directive/Advance Care Plan: full code,no AMD on file    Likelihood of Readmission:  Low/green                         Transition of Care Plan:                    Pt is in observation status due to r/o TIA/r/ro CVA. PT is working with pt currently so will interview pt after therapy completes their session and will have pt sign observation letters per medicare guidelines. Pt is on iv labetolol. per pt.'s nurse,pt \"forgot \" to take her medication resulting in blood pressure elevations. Speech/PT/Ot evaluations ordered. Per nurse,ED nurse placed call to forensic nurse. See ED note. I am following pt for discharge needs.     Tianna Robbins

## 2019-05-23 NOTE — PROGRESS NOTES
Physical Therapy Note:    Orders acknowledged, chart reviewed, discussed with RN. Pt remains hypertensive and RN reports plans to initiate labetalol drip for management. Will hold PT evaluation at this time, continue to follow, and proceed with PT evaluation when vitals stable and pt appropriate for increased activity.

## 2019-05-23 NOTE — PROGRESS NOTES
Spiritual Care Assessment/Progress Note  [address]      NAME: Addie Patiño      MRN: [id number]  AGE: 70 y.o. SEX: female  [employer] Affiliation:    Language: English     5/23/2019     Total Time (in minutes): 5     Spiritual Assessment begun in [facility] EMERGENCY DEPT through conversation with:         []Patient        [] Family    [] Friend(s)        Reason for Consult: Other (comment)(code stroke)     Spiritual beliefs: (Please include comment if needed)     [] Identifies with a lizy tradition:         [] Supported by a lizy community:            [] Claims no spiritual orientation:           [] Seeking spiritual identity:                [] Adheres to an individual form of spirituality:           [x] Not able to assess:                           Identified resources for coping:      [] Prayer                               [] Music                  [] Guided Imagery     [] Family/friends                 [] Pet visits     [] Devotional reading                         [x] Unknown     [] Other:                                              Interventions offered during this visit: (See comments for more details)                Plan of Care:     [] Support spiritual and/or cultural needs    [] Support AMD and/or advance care planning process      [] Support grieving process   [] Coordinate Rites and/or Rituals    [] Coordination with community clergy   [] No spiritual needs identified at this time   [] Detailed Plan of Care below (See Comments)  [] Make referral to Music Therapy  [] Make referral to Pet Therapy     [] Make referral to Addiction services  [] Make referral to [facility]  [] Make referral to Spiritual Care Partner  [] No future visits requested        [x] Follow up visits as needed     Comments: I responded to the code stroke, but it was not an appropriate time for a spiritual care visit, as staff was working with MsRita Mckenzie at the time. No family was present.      Spiritual Care remains available as needed. Rev. Naya Steinberg M.Div, [facility]

## 2019-05-23 NOTE — PROGRESS NOTES
1445 Pt admitted to [facility] for Hypertensive crisis. Titrating Labatolol IV. Admission assessment completed. A&O x4. Steady gait. SR. Breath sounds CTA&P. Room air. No cough. BS active x4, abd soft, round. BM today. Voiding clear martha without difficulty. BPPP. Applied bilateral SCD's. Pt turns self. Skin assessed with Radha Jacobo RN. Skin intact. No skin breakdown. See assessment. 1505 Medicated with Nicardipine po. Swallowing without difficulty. PT at bedside for eval.   1545 Dr. Arvid Simpson called. Spoke with neurology. Goal 's. Stopped Labatolol drip. 's-150's at this time. 1820 Carotid duplex complete. 1830 To MRI via w/c for MRI of brain. 1900 Returned from MRI. Eating dinner. Bedside and Verbal shift change report given to Esther Bright RN (oncoming nurse) by Magda Cool RN (offgoing nurse). Report included the following information SBAR, Kardex, ED Summary, Intake/Output, MAR, Recent Results and Cardiac Rhythm sr. Dual skin assessment. Skin intact.

## 2019-05-24 ENCOUNTER — APPOINTMENT (OUTPATIENT)
Dept: CT IMAGING | Age: 69
End: 2019-05-24
Attending: INTERNAL MEDICINE
Payer: MEDICARE

## 2019-05-24 VITALS
SYSTOLIC BLOOD PRESSURE: 157 MMHG | WEIGHT: 178.57 LBS | HEIGHT: 65 IN | TEMPERATURE: 98.2 F | HEART RATE: 82 BPM | OXYGEN SATURATION: 98 % | BODY MASS INDEX: 29.75 KG/M2 | RESPIRATION RATE: 14 BRPM | DIASTOLIC BLOOD PRESSURE: 87 MMHG

## 2019-05-24 PROBLEM — R29.818 TRANSIENT NEUROLOGICAL SYMPTOMS: Status: ACTIVE | Noted: 2019-05-24

## 2019-05-24 LAB
ALBUMIN SERPL-MCNC: 3.6 G/DL (ref 3.5–5)
ALBUMIN/GLOB SERPL: 0.8 {RATIO} (ref 1.1–2.2)
ALP SERPL-CCNC: 103 U/L (ref 45–117)
ALT SERPL-CCNC: 25 U/L (ref 12–78)
ANION GAP SERPL CALC-SCNC: 7 MMOL/L (ref 5–15)
AST SERPL-CCNC: 18 U/L (ref 15–37)
BACTERIA SPEC CULT: NORMAL
BACTERIA SPEC CULT: NORMAL
BASOPHILS # BLD: 0.1 K/UL (ref 0–0.1)
BASOPHILS NFR BLD: 1 % (ref 0–1)
BILIRUB SERPL-MCNC: 0.6 MG/DL (ref 0.2–1)
BUN SERPL-MCNC: 10 MG/DL (ref 6–20)
BUN/CREAT SERPL: 14 (ref 12–20)
CALCIUM SERPL-MCNC: 8.8 MG/DL (ref 8.5–10.1)
CHLORIDE SERPL-SCNC: 107 MMOL/L (ref 97–108)
CHOLEST SERPL-MCNC: 196 MG/DL
CO2 SERPL-SCNC: 25 MMOL/L (ref 21–32)
CREAT SERPL-MCNC: 0.73 MG/DL (ref 0.55–1.02)
DIFFERENTIAL METHOD BLD: NORMAL
EOSINOPHIL # BLD: 0.2 K/UL (ref 0–0.4)
EOSINOPHIL NFR BLD: 2 % (ref 0–7)
ERYTHROCYTE [DISTWIDTH] IN BLOOD BY AUTOMATED COUNT: 13.1 % (ref 11.5–14.5)
EST. AVERAGE GLUCOSE BLD GHB EST-MCNC: 111 MG/DL
GLOBULIN SER CALC-MCNC: 4.3 G/DL (ref 2–4)
GLUCOSE SERPL-MCNC: 103 MG/DL (ref 65–100)
HBA1C MFR BLD: 5.5 % (ref 4.2–6.3)
HCT VFR BLD AUTO: 43 % (ref 35–47)
HDLC SERPL-MCNC: 59 MG/DL
HDLC SERPL: 3.3 {RATIO} (ref 0–5)
HGB BLD-MCNC: 14.6 G/DL (ref 11.5–16)
IMM GRANULOCYTES # BLD AUTO: 0 K/UL (ref 0–0.04)
IMM GRANULOCYTES NFR BLD AUTO: 0 % (ref 0–0.5)
LDLC SERPL CALC-MCNC: 113.6 MG/DL (ref 0–100)
LEFT CCA DIST DIAS: 21.5 CM/S
LEFT CCA DIST SYS: 65.1 CM/S
LEFT CCA PROX DIAS: 18 CM/S
LEFT CCA PROX SYS: 51.1 CM/S
LEFT ECA DIAS: 14.95 CM/S
LEFT ECA SYS: 67.7 CM/S
LEFT ICA DIST DIAS: 18.8 CM/S
LEFT ICA DIST SYS: 66 CM/S
LEFT ICA MID DIAS: 27.6 CM/S
LEFT ICA MID SYS: 72.1 CM/S
LEFT ICA PROX DIAS: 18.9 CM/S
LEFT ICA PROX SYS: 53.1 CM/S
LEFT ICA/CCA SYS: 1.11
LEFT SUBCLAVIAN DIAS: 5.29 CM/S
LEFT SUBCLAVIAN SYS: 200.3 CM/S
LEFT VERTEBRAL DIAS: 16.23 CM/S
LEFT VERTEBRAL SYS: 55.5 CM/S
LIPID PROFILE,FLP: ABNORMAL
LYMPHOCYTES # BLD: 2.4 K/UL (ref 0.8–3.5)
LYMPHOCYTES NFR BLD: 34 % (ref 12–49)
MAGNESIUM SERPL-MCNC: 2.3 MG/DL (ref 1.6–2.4)
MCH RBC QN AUTO: 30.3 PG (ref 26–34)
MCHC RBC AUTO-ENTMCNC: 34 G/DL (ref 30–36.5)
MCV RBC AUTO: 89.2 FL (ref 80–99)
MONOCYTES # BLD: 0.6 K/UL (ref 0–1)
MONOCYTES NFR BLD: 8 % (ref 5–13)
NEUTS SEG # BLD: 3.8 K/UL (ref 1.8–8)
NEUTS SEG NFR BLD: 55 % (ref 32–75)
NRBC # BLD: 0 K/UL (ref 0–0.01)
NRBC BLD-RTO: 0 PER 100 WBC
PHOSPHATE SERPL-MCNC: 3.8 MG/DL (ref 2.6–4.7)
PLATELET # BLD AUTO: 282 K/UL (ref 150–400)
PMV BLD AUTO: 9.2 FL (ref 8.9–12.9)
POTASSIUM SERPL-SCNC: 3.8 MMOL/L (ref 3.5–5.1)
PROT SERPL-MCNC: 7.9 G/DL (ref 6.4–8.2)
RBC # BLD AUTO: 4.82 M/UL (ref 3.8–5.2)
RIGHT CCA DIST DIAS: 23.2 CM/S
RIGHT CCA DIST SYS: 66.8 CM/S
RIGHT CCA PROX DIAS: 16.2 CM/S
RIGHT CCA PROX SYS: 59.8 CM/S
RIGHT ECA DIAS: 11.81 CM/S
RIGHT ECA SYS: 49.5 CM/S
RIGHT ICA DIST DIAS: 31.4 CM/S
RIGHT ICA DIST SYS: 76.5 CM/S
RIGHT ICA MID DIAS: 29.2 CM/S
RIGHT ICA MID SYS: 67.7 CM/S
RIGHT ICA PROX DIAS: 17.5 CM/S
RIGHT ICA PROX SYS: 43.1 CM/S
RIGHT ICA/CCA SYS: 1.1
RIGHT SUBCLAVIAN DIAS: 0 CM/S
RIGHT SUBCLAVIAN SYS: 80.7 CM/S
RIGHT VERTEBRAL DIAS: 13.24 CM/S
RIGHT VERTEBRAL SYS: 32.5 CM/S
SERVICE CMNT-IMP: NORMAL
SODIUM SERPL-SCNC: 139 MMOL/L (ref 136–145)
TRIGL SERPL-MCNC: 117 MG/DL (ref ?–150)
VLDLC SERPL CALC-MCNC: 23.4 MG/DL
WBC # BLD AUTO: 6.9 K/UL (ref 3.6–11)

## 2019-05-24 PROCEDURE — 80053 COMPREHEN METABOLIC PANEL: CPT

## 2019-05-24 PROCEDURE — 97165 OT EVAL LOW COMPLEX 30 MIN: CPT

## 2019-05-24 PROCEDURE — 97535 SELF CARE MNGMENT TRAINING: CPT

## 2019-05-24 PROCEDURE — 92610 EVALUATE SWALLOWING FUNCTION: CPT

## 2019-05-24 PROCEDURE — 83735 ASSAY OF MAGNESIUM: CPT

## 2019-05-24 PROCEDURE — 83036 HEMOGLOBIN GLYCOSYLATED A1C: CPT

## 2019-05-24 PROCEDURE — 74011250637 HC RX REV CODE- 250/637: Performed by: INTERNAL MEDICINE

## 2019-05-24 PROCEDURE — 74011636320 HC RX REV CODE- 636/320: Performed by: RADIOLOGY

## 2019-05-24 PROCEDURE — 85025 COMPLETE CBC W/AUTO DIFF WBC: CPT

## 2019-05-24 PROCEDURE — 84100 ASSAY OF PHOSPHORUS: CPT

## 2019-05-24 PROCEDURE — 99218 HC RM OBSERVATION: CPT

## 2019-05-24 PROCEDURE — 70496 CT ANGIOGRAPHY HEAD: CPT

## 2019-05-24 PROCEDURE — 97116 GAIT TRAINING THERAPY: CPT

## 2019-05-24 PROCEDURE — 36415 COLL VENOUS BLD VENIPUNCTURE: CPT

## 2019-05-24 PROCEDURE — 97530 THERAPEUTIC ACTIVITIES: CPT

## 2019-05-24 PROCEDURE — 80061 LIPID PANEL: CPT

## 2019-05-24 RX ORDER — ATORVASTATIN CALCIUM 40 MG/1
40 TABLET, FILM COATED ORAL DAILY
Qty: 30 TAB | Refills: 0 | Status: SHIPPED | OUTPATIENT
Start: 2019-05-25

## 2019-05-24 RX ORDER — ATORVASTATIN CALCIUM 20 MG/1
40 TABLET, FILM COATED ORAL DAILY
Status: DISCONTINUED | OUTPATIENT
Start: 2019-05-24 | End: 2019-05-24 | Stop reason: HOSPADM

## 2019-05-24 RX ORDER — NIFEDIPINE 60 MG/1
60 TABLET, EXTENDED RELEASE ORAL DAILY
Qty: 30 TAB | Refills: 0 | Status: SHIPPED | OUTPATIENT
Start: 2019-05-24

## 2019-05-24 RX ORDER — CLOPIDOGREL BISULFATE 75 MG/1
75 TABLET ORAL DAILY
Qty: 30 TAB | Refills: 0 | Status: SHIPPED | OUTPATIENT
Start: 2019-05-24

## 2019-05-24 RX ADMIN — Medication 10 ML: at 06:00

## 2019-05-24 RX ADMIN — ATORVASTATIN CALCIUM 40 MG: 20 TABLET, FILM COATED ORAL at 08:48

## 2019-05-24 RX ADMIN — ASPIRIN 81 MG 81 MG: 81 TABLET ORAL at 08:48

## 2019-05-24 RX ADMIN — IOPAMIDOL 100 ML: 755 INJECTION, SOLUTION INTRAVENOUS at 11:21

## 2019-05-24 RX ADMIN — NIFEDIPINE 60 MG: 60 TABLET, FILM COATED, EXTENDED RELEASE ORAL at 08:48
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
## 2019-05-24 NOTE — CONSULTS
[facility]     Name: Eleazar Olson MRN: [id number]   : 1950 Hospital: [address]   Date: 2019        Impression Plan   1. Word finding difficulties/TIA sxs  2. Abnormal MRI- ICA stenosis? 3. HTN               · CTA head this morning to take closer look at ICA  · Cleared by speech  · BP control- on home nifedipine  · ASA  · Lovenox proph           Radiology  ( personally reviewed) No chest imaging during this admission   ABG No results for input(s): PHI, PO2I, PCO2I in the last 72 hours. Subjective     Cc: word finding difficulties    71 yo who presented to the ED yesterday with complaints of word finding difficulties. She improved on her own. MRI head showed quesitonable ICA stenosis. Pt feel back to her baseline this morning. Review of Systems:  A comprehensive review of systems was negative except for that written in the HPI. Past Medical History:   Diagnosis Date    Depression       History reviewed. No pertinent surgical history. Prior to Admission medications    Medication Sig Start Date End Date Taking? Authorizing Provider   loratadine (CLARITIN) 10 mg tablet Take 10 mg by mouth daily as needed. Yes Other, MD Mariel   NIFEdipine ER (PROCARDIA XL) 60 mg ER tablet Take 60 mg by mouth daily. Yes Provider, Historical   aspirin 81 mg chewable tablet Take 81 mg by mouth daily.    Yes Provider, Historical     Current Facility-Administered Medications   Medication Dose Route Frequency    atorvastatin (LIPITOR) tablet 40 mg  40 mg Oral DAILY    iopamidol (ISOVUE-370) 76 % injection 100 mL  100 mL IntraVENous RAD ONCE    sodium chloride (NS) flush 5-40 mL  5-40 mL IntraVENous Q8H    aspirin chewable tablet 81 mg  81 mg Oral DAILY    NIFEdipine ER (PROCARDIA XL) tablet 60 mg  60 mg Oral DAILY    enoxaparin (LOVENOX) injection 40 mg  40 mg SubCUTAneous Q24H     No Known Allergies   Social History     Tobacco Use    Smoking status: Never Smoker  Smokeless tobacco: Never Used   Substance Use Topics    Alcohol use: No      Family History   Problem Relation Age of Onset    Heart Disease Father     Heart Disease Mother     Heart Disease Brother           Laboratory: I have personally reviewed the critical care flowsheet and labs.      Recent Labs     05/24/19  0208 05/23/19  1116   WBC 6.9 6.5   HGB 14.6 14.8   HCT 43.0 44.1    211     Recent Labs     05/24/19  0208 05/23/19  1116    135*   K 3.8 4.4    105   CO2 25 26   * 107*   BUN 10 12   CREA 0.73 0.91   CA 8.8 8.8   MG 2.3  --    PHOS 3.8  --    ALB 3.6 3.6   SGOT 18 21   ALT 25 27       Objective:     Mode Rate Tidal Volume Pressure FiO2 PEEP                    Vital Signs:     TMAX(24)      Intake/Output:   Last shift:         Last 3 shifts: 05/24 0701 - 05/24 1900  In: 240 [P.O.:240]  Out: - RRIOLAST3    Intake/Output Summary (Last 24 hours) at 5/24/2019 1053  Last data filed at 5/24/2019 1009  Gross per 24 hour   Intake 240 ml   Output 1750 ml   Net -1510 ml     EXAM:   GENERAL: well developed and in no distress, HEENT:  PERRL, EOMI, no alar flaring or epistaxis, oral mucosa moist without cyanosis, NECK:  no jugular vein distention, no retractions, no thyromegaly or masses, LUNGS: CTA, no w/r/r , HEART:  Regular rate and rhythm with no MGR; no edema is present, ABDOMEN:  soft with no tenderness, bowel sounds present, EXTREMITIES:  warm with no cyanosis, SKIN:  no jaundice or ecchymosis and NEUROLOGIC:  alert and oriented, grossly non-focal    Shiva Hameed MD  [facility]

## 2019-05-24 NOTE — PROGRESS NOTES
Occupational Therapy EVALUATION/discharge  Patient: Tate Olsen (73 y.o. female)  Date: 5/24/2019  Primary Diagnosis: Hypertensive crisis [I16.9]       Precautions: universal       ASSESSMENT:   Based on the objective data described below, the patient presents with good overall activity tolerance following admission for hypertensive urgency with slurred speech. CT and MRI were both negative for acute process. Patient lives with family and reports she was independent PTA. Today, she was received up and required supervision for OOB transfers. She was independent to mod I level for completion of all ADLs. Patient was educated on signs and symptoms of stroke (based on admitting symptoms) and safe transfer techniques. Patient has no further skilled OT needs and anticipates discharge home later today. Discharge Recommendations: None  Further Equipment Recommendations for Discharge: none       SUBJECTIVE:   Patient was agreeable to OT evaluation    OBJECTIVE DATA SUMMARY:   HISTORY:   Past Medical History:   Diagnosis Date    Depression    History reviewed. No pertinent surgical history. Prior Level of Function/Environment/Context: Patient lives with family.       Home Situation  Home Environment: Private residence  # Steps to Enter: 2  Rails to Enter: Yes  Hand Rails : Bilateral  One/Two Story Residence: One story  Living Alone: No  Support Systems: Spouse/Significant Other/Partner  Patient Expects to be Discharged to[de-identified] Private residence  Current DME Used/Available at Home: None    Hand dominance: Right    EXAMINATION OF PERFORMANCE DEFICITS:  Cognitive/Behavioral Status:  Neurologic State: Alert  Orientation Level: Oriented X4  Cognition: Appropriate decision making(she speaks [place] and  English as a second language)  Perception: Appears intact  Perseveration: No perseveration noted  Safety/Judgement: Awareness of environment    Skin: Intact in the uppers    Edema: None noted in the uppers    Hearing: Auditory  Auditory Impairment: None    Vision/Perceptual:    Tracking: Able to track stimulus in all quadrants w/o difficulty    Diplopia: No    Acuity: Within Defined Limits       Range of Motion:  AROM: Within functional limits  PROM: Within functional limits    Strength:  Strength: Within functional limits    Coordination:  Fine Motor Skills-Upper: Left Intact; Right Intact    Gross Motor Skills-Upper: Left Intact; Right Intact    Tone & Sensation:  Tone: Normal  Sensation: intact    Balance:  Sitting: Intact  Sitting - Static: Good (unsupported)  Sitting - Dynamic: Good (unsupported)  Standing: Intact; Without support  Standing - Static: Good  Standing - Dynamic : Fair    Functional Mobility and Transfers for ADLs:  Bed Mobility:  Rolling: Supervision  Supine to Sit: Supervision  Sit to Supine: Supervision  Scooting: Supervision    Transfers:  Sit to Stand: Supervision  Stand to Sit: Supervision  Bed to Chair: Supervision  Toilet Transfer : Supervision    ADL Assessment:  Feeding: Independent    Oral Facial Hygiene/Grooming: Independent    Bathing: Modified independent    Upper Body Dressing: Independent    Lower Body Dressing: Modified independent    Toileting: Modified independent        Cognitive Retraining  Safety/Judgement: Awareness of environment    Functional Measure:  Fugl-Hester Assessment of Motor Recovery after Stroke:     Reflex Activity  Flexors/Biceps/Fingers: Can be elicited  Extensors/Triceps: Can be elicited  Reflex Subtotal: 4    Volitional Movement Within Synergies  Shoulder Retraction: Full  Shoulder Elevation: Full  Shoulder Abduction (90 degrees): Full  Shoulder External Rotation: Full  Elbow Flexion: Full  Forearm Supination: Full  Shoulder Adduction/Internal Rotation: Full  Elbow Extension: Full  Forearm Pronation: Full  Subtotal: 18    Volitional Movement Mixing Synergies  Hand to Lumbar Spine: Full  Shoulder Flexion (0-90 degrees): Full  Pronation-Supination: Full  Subtotal: 6    Volitional Movement With Little or No Synergy  Shoulder Abduction (0-90 degrees): Full  Shoulder Flexion ( degrees): Full  Pronation/Supination: Full  Subtotal : 6    Normal Reflex Activity  Biceps, Triceps, Finger Flexors: Full  Subtotal : 2    Upper Extremity Total   Upper Extremity Total: 36    Wrist  Stability at 15 Degree Dorsiflexion: Full  Repeated Dorsiflexion/ Volar Flexion: Full  Stability at 15 Degree Dorsiflexion: Full  Repeated Dorsiflexion/ Volar Flexion: Full  Circumduction: Full  Wrist Total: 10    Hand  Mass Flexion: Full  Mass Extension: Full  Grasp A: Full  Grasp B: Full  Grasp C: Full  Grasp D: Full  Grasp E: Full  Hand Total: 14    Coordination/Speed  Tremor: None  Dysmetria: None  Time: <1s  Coordination/Speed Total : 6    Total A-D  Total A-D (Motor Function): 66/66         This is a reliable/valid measure of arm function after a neurological event. It has established value to characterize functional status and for measuring spontaneous and therapy-induced recovery; tests proximal and distal motor functions. Fugl-Hester Assessment  UE scores recorded between five and 30 days post neurologic event can be used to predict UE recovery at six months post neurologic event. Severe = 0-21 points   Moderately Severe = 22-33 points   Moderate = 34-47 points   Mild = 48-66 points  Ada LEEANN Donaldson, SADE Washington, & BRANDON Spence (1992). Measurement of motor recovery after stroke: Outcome assessment and sample size requirements.  Stroke, 23, pp. 7488-3081.   --------------------------------------------------------------------------------------------------------------------------------------------------------------------  MCID:  Stroke:   Oz Triplett et al, 2001; n = 171; mean age 79 (6) years; assessed within 16 (12) days of stroke, Acute Stroke)  FMA Motor Scores from Admission to Discharge   10 point increase in FMA Upper Extremity = 1.5 change in discharge FIM 10 point increase in FMA Lower Extremity = 1.9 change in discharge FIM  MDC:   Stroke:   Sandro Jones et al, 2008, n = 14, mean age = 59.9 (14.6) years, assessed on average 14 (6.5) months post stroke, Chronic Stroke)   FMA = 5.2 points for the Upper Extremity portion of the assessment       Occupational Therapy Evaluation Charge Determination   History Examination Decision-Making   LOW Complexity : Brief history review  LOW Complexity : 1-3 performance deficits relating to physical, cognitive , or psychosocial skils that result in activity limitations and / or participation restrictions  LOW Complexity : No comorbidities that affect functional and no verbal or physical assistance needed to complete eval tasks       Based on the above components, the patient evaluation is determined to be of the following complexity level: LOW   Pain:  Pain Scale 1: Numeric (0 - 10)  Pain Intensity 1: 0              Activity Tolerance:   Patient tolerated eval well. After treatment:   [x]  Patient left in no apparent distress sitting up in chair  []  Patient left in no apparent distress in bed  [x]  Call bell left within reach  [x]  Nursing notified  []  Caregiver present  []  Bed alarm activated    COMMUNICATION/EDUCATION:   Communication/Collaboration:  [x]      Home safety education was provided and the patient/caregiver indicated understanding. [x]      Patient/family have participated as able and agree with findings and recommendations. []      Patient is unable to participate in plan of care at this time. Findings and recommendations were discussed with: Physical Therapist, Registered Nurse and patient.     BRENDEN Flores/L  Time Calculation: 27 mins

## 2019-05-24 NOTE — FORENSIC NURSE
WALLY Bender spoke with WALLY Sheriff who reports that patient declined forensics and police involvement. WALLY Bender provided [facility] contact information to TONEY Dinh.

## 2019-05-24 NOTE — PROGRESS NOTES
Speech Pathology bedside swallow evaluation/discharge  Patient: Ernesto Rodriguez (25 y.o. female)  Date: 5/24/2019  Primary Diagnosis: Hypertensive crisis [I16.9]       Precautions:        ASSESSMENT :  Based on the objective data described below, the patient presents with normal speech and swallowing. She speaks [place] as a second language. Reported that initially she had slurred speech, but now resolved. Admitted with HTN and speech issues 5-23-19. Possible TIA/CVA. PMH: depression. .  Skilled acute therapy provided by a speech-language pathologist is not indicated at this time. PLAN :  Recommendations:  Ok for regular diet, thins. n  No SLP f/u needed. Discharge Recommendations: None     SUBJECTIVE:   Patient stated I'm ok. PATIENT WITH HTN:  170/103  OBJECTIVE:     Past Medical History:   Diagnosis Date    Depression    History reviewed. No pertinent surgical history. Prior Level of Function/Home Situation:   Home Situation  Home Environment: Private residence  # Steps to Enter: 2  Rails to Enter: Yes  Hand Rails : Bilateral  One/Two Story Residence: One story  Living Alone: No  Support Systems: Spouse/Significant Other/Partner  Patient Expects to be Discharged to[de-identified] Private residence  Current DME Used/Available at Home: None  Diet prior to admission: regular, thins  Current Diet:  Regular, thins. She passed the STAND   Cognitive and Communication Status:  Neurologic State: Alert  Orientation Level: Oriented X4  Cognition: Appropriate decision making(she speaks [place] and  [place] as a second language)  Perception: Appears intact  Perseveration: No perseveration noted     Oral Assessment:  Oral Assessment  Labial: No impairment  Dentition: Upper & lower dentures  Oral Hygiene: WFL  Lingual: No impairment  Velum: Unable to visualize  Mandible: No impairment  P.O. Trials:  Patient Position: uprightin bed  Vocal quality prior to P.O.: No impairment  Consistency Presented:  Thin liquid;Mixed consistency  How Presented: Self-fed/presented;Straw;Spoon   ORAL PHASE:   Bolus Acceptance: No impairment  Bolus Formation/Control: No impairment     Propulsion: No impairment  Oral Residue: None   PHARYNGEAL PHASE:   Initiation of Swallow: No impairment  Laryngeal Elevation: Functional  Aspiration Signs/Symptoms: None  Pharyngeal Phase Characteristics: No impairment, issues, or problems                    NOMS:   The NOMS functional outcome measure was used to quantify this patient's level of swallowing impairment. Based on the NOMS, the patient was determined to be at level 7 for swallow function     NOMS Swallowing Levels:  Level 1 (CN): NPO  Level 2 (CM): NPO but takes consistency in therapy  Level 3 (CL): Takes less than 50% of nutrition p.o. and continues with nonoral feedings; and/or safe with mod cues; and/or max diet restriction  Level 4 (CK): Safe swallow but needs mod cues; and/or mod diet restriction; and/or still requires some nonoral feeding/supplements  Level 5 (CJ): Safe swallow with min diet restriction; and/or needs min cues  Level 6 (CI): Independent with p.o.; rare cues; usually self cues; may need to avoid some foods or needs extra time  Level 7 ([address] [place] [address]): Independent for all p.o.  DANIELLA. (2003). National Outcomes Measurement System (NOMS): Adult Speech-Language Pathology User's Guide. Pain:  Pain Scale 1: Numeric (0 - 10)  Pain Intensity 1: 0     After treatment:   [] Patient left in no apparent distress sitting up in chair  [x] Patient left in no apparent distress in bed  [] Call bell left within reach  [x] Nursing notified  [] Caregiver present  [] Bed alarm activated    COMMUNICATION/EDUCATION:   The patients plan of care including findings, recommendations, and recommended diet changes were discussed with: Registered Nurse. Educated patient about s/s of CVA, including speech and swallowing. [] Posted safety precautions in patient's room.   [x] Patient/family have participated as able and agree with findings and recommendations. [] Patient is unable to participate in plan of care at this time.     Thank you for this referral.  Jose Kidd, [facility]  Time Calculation: 10 mins

## 2019-05-24 NOTE — PROGRESS NOTES
Problem: Mobility Impaired (Adult and Pediatric)  Goal: *Acute Goals and Plan of Care (Insert Text)  Description  Physical Therapy Goals  Initiated 5/23/2019  1. Patient will move from supine to sit and sit to supine  and scoot up and down in bed with modified independence within 7 day(s). 2.  Patient will transfer from bed to chair and chair to bed with modified independence using the least restrictive device within 7 day(s). 3.  Patient will perform sit to stand with modified independence within 7 day(s). 4.  Patient will ambulate with modified independence for 150 feet with the least restrictive device within 7 day(s). 5.  Patient will ascend/descend 2 stairs with handrail(s) with modified independence within 7 day(s). Outcome: Progressing Towards Goal   PHYSICAL THERAPY TREATMENT  Patient: Milagro Cadena (70 y.o. female)  Date: 5/24/2019  Diagnosis: Hypertensive crisis [I16.9] <principal problem not specified>       Precautions:  universal  Chart, physical therapy assessment, plan of care and goals were reviewed. ASSESSMENT:  Based on the objective data described below, patient participated well with treatment today. Rolled on the edge of bed supervision, supine to sit supervision, sit to stand supervision, ambulate without assistive device out on the ICU hallway supervision. No loss of balance OOB to chair as tolerated performed some active range of motion exercise on both LE all planes. Educate and  Instructed patient to continue active range of motion exercise on both legs while up on chair or on bed. Notified nurse who agreed to monitor and assist back to bed when ready to go back. Do not anticipate needing Physical Therapy  when medically stable to go home. Progression toward goals:  ?    Improving appropriately and progressing toward goals  ? Improving slowly and progressing toward goals  ?     Not making progress toward goals and plan of care will be adjusted     PLAN:  Patient continues to benefit from skilled intervention to address the above impairments. Continue treatment per established plan of care. Discharge Recommendations:  None  Further Equipment Recommendations for Discharge:  none     SUBJECTIVE:   Patient stated ? Ok I feel fine. ?    OBJECTIVE DATA SUMMARY:   Critical Behavior:  Neurologic State: Alert  Orientation Level: Oriented X4  Cognition: Appropriate decision making(she speaks [place] and  English as a second language)     Functional Mobility Training:  Bed Mobility:  Rolling: Supervision  Supine to Sit: Supervision  Sit to Supine: Supervision  Scooting: Supervision        Transfers:  Sit to Stand: Supervision  Stand to Sit: Supervision  Stand Pivot Transfers: Supervision     Bed to Chair: Supervision                    Balance:  Sitting: Intact  Sitting - Static: Good (unsupported)  Sitting - Dynamic: Good (unsupported)  Standing: Intact; Without support  Standing - Static: Good  Standing - Dynamic : Fair  Ambulation/Gait Training:  Distance (ft): 300 Feet (ft)  Assistive Device: Gait belt  Ambulation - Level of Assistance: Supervision     Gait Description (WDL): Within defined limits                 Speed/Stephanie: Pace decreased (<100 feet/min)             Therapeutic Exercises:    Instructed patient to continue active range of motion exercise on both legs while up on chair or on bed. Pain:  Pain Scale 1: Numeric (0 - 10)  Pain Intensity 1: 0              Activity Tolerance:   Good. Please refer to the flowsheet for vital signs taken during this treatment. After treatment:   ?    Patient left in no apparent distress sitting up in chair  ? Patient left in no apparent distress in bed  ? Call bell left within reach  ? Nursing notified  ? Caregiver present  ? Bed alarm activated    COMMUNICATION/COLLABORATION:   The patient?s plan of care was discussed with: Registered Nurse and patient     Vivian Langston PT,WCC.    Time Calculation: 24 mins

## 2019-05-24 NOTE — DISCHARGE SUMMARY
Physician Discharge Summary     Patient ID:  Milagro Cadena  [id number]  86 y.o.  1950    Admit date: 5/23/2019    Discharge date: 5/24/2019    Admission Diagnoses: Hypertensive crisis [I16.9]    Principal Discharge Diagnoses:    Hypertensive crisis  Transient neurologic symptom without TIA or CVA  Carotid artery stenosis    OTHER PROBLEMS ADDRESSEDS  Principal Diagnosis <principal problem not specified>                                            Active Problems:    Hypertensive crisis (5/23/2019)      Depression (5/23/2019)      GERD (gastroesophageal reflux disease) (5/23/2019)      Word finding difficulty (5/23/2019)       Patient Active Problem List   Diagnosis Code    Hypertensive crisis I16.9    Depression F32.9    GERD (gastroesophageal reflux disease) K21.9    Word finding difficulty R47.89         Hospital Course:   Ms. Ab Larsen is a 71 yo Malian F w/ hx of HTN presenting with accelerated HTN and transient trouble with word-finding    Hypertensive crisis: transient word finding difficulty earlier today, and having speech difficulties. Her symptoms quickly resolved, and her TIA/CVA revealed no evidence of CVA. Discussed with neurology, who thought not TIA but transient neurologic symptoms. She was found to have proximal L int carotid artery stenosis, for which she will follow up with Dr. Alysa Triplett, neurointerventional surgery. She was started on DAPT at neurology's recommendation, and a statin. Her BP is controlled at discharge      Depression: not apparently on antidepressants. Follow up outpatient        Pt discharged in improved and stable condition. Procedures performed: none    Imaging studies:   CTA H&N  1. Moderate proximal left internal carotid artery stenosis. Widely patent right  carotid bifurcation. Otherwise negative CT angiography of the neck. 2. Mild to moderate irregularity of the intracranial vessels without  flow-limiting stenosis.  The suspected distal right ICA stenosis which was  questioned on the MR angiogram was not confirmed on this study    MRI brain  Mild chronic microvascular ischemic change and cerebral atrophy. Multifocal possibly severe intracranial stenoses, right supraclinoid ICA  stenosis is suspected. While some of these stenoses may be attributable to  motion artifact some are not definitively artifactual in nature. CTA of the head  for further delineation is recommended. No intracranial mass, hemorrhage or evidence of acute infarction. TTE   · Left Ventricle: Estimated left ventricular ejection fraction is 56 - 60%. No regional wall motion abnormality noted. · Right Ventricle: Not well visualized. Borderline low systolic function. · Aorta: Mild aortic root dilatation. · Interatrial Septum: Contrast was used to evaluate for intracardiac shunt. PCP: Other, Phys, MD    Consults: Neurology    Discharge Exam:  Patient Vitals for the past 12 hrs:   Temp Pulse Resp BP SpO2   05/24/19 1135 98.2 °F (36.8 °C) 82 14 157/87 98 %   05/24/19 1022    155/86    05/24/19 1006  84 19 (!) 166/93 98 %   05/24/19 0900  69 13 146/78 96 %   05/24/19 0820  71 15 132/84 98 %   05/24/19 0700 98 °F (36.7 °C) 67 13 128/68 95 %   05/24/19 0600  76 13 118/69 94 %     GEN: NAD  CV: RRR  RESP: CTAB  NEURO: CN 3-12 intact. No focal weakness 5/5 strength BUE and BLE. No numbness    Disposition: home    Patient Instructions:   Current Discharge Medication List      START taking these medications    Details   atorvastatin (LIPITOR) 40 mg tablet Take 1 Tab by mouth daily. Qty: 30 Tab, Refills: 0      clopidogrel (PLAVIX) 75 mg tab Take 1 Tab by mouth daily. Qty: 30 Tab, Refills: 0         CONTINUE these medications which have CHANGED    Details   NIFEdipine ER (PROCARDIA XL) 60 mg ER tablet Take 1 Tab by mouth daily.   Qty: 30 Tab, Refills: 0         CONTINUE these medications which have NOT CHANGED    Details   loratadine (CLARITIN) 10 mg tablet Take 10 mg by mouth daily as needed. aspirin 81 mg chewable tablet Take 81 mg by mouth daily. Activity: See discharge instructions  Diet: See discharge instructions  Wound Care: See discharge instructions    Follow-up Information     Follow up With Specialties Details Why Contact Info    Other, MD Mariel    Patient can only remember the practice name and not the physician      [address] [place] [address]  In 2 weeks  [address] [facility]  [phone]    Melisa Amaro MD Endovascular Surgical Neuroradiology  for internal carotid stenosis [address] [facility] [place] [address]  [phone]            I spent 35 minutes on this discharge.     Signed:  Ling Bobby MD  5/24/2019  12:07 PM    CC: PCP Dr. Don Perea

## 2019-05-24 NOTE — PROGRESS NOTES
Occupational Therapy Note:  Orders acknowledged, chart reviewed, and spoke with nursing. Patient is currently off the floor for testing. Will continue to follow for OT evaluation. Thank you.   Sherry Borja OTR/L

## 2019-05-24 NOTE — DISCHARGE INSTRUCTIONS
HOSPITALIST DISCHARGE INSTRUCTIONS  NAME: Alfonzo Feliciano   :  1950   MRN:  [id number]     Date/Time:  2019 12:06 PM    ADMIT DATE: 2019     DISCHARGE DATE: 2019     PRINCIPAL DISCHARGE DIAGNOSES:  Carotid artery stenosis  Transient neurologic symptoms    MEDICATIONS:  · It is important that you take the medication exactly as they are prescribed. Note the changes and additions to your medications. Be sure you understand these changes before you are discharged today. · Keep your medication in the bottles provided by the pharmacist and keep a list of the medication names, dosages, and times to be taken in your wallet. · Do not take other medications without consulting your doctor. Pain Management: per above medications    What to do at Home    Recommended diet:  Cardiac Diet and Low fat, Low cholesterol    Recommended activity: Activity as tolerated    If you experience any of the following symptoms then please call your primary care physician or return to the emergency room if you cannot get hold of your doctor:  Severe headache, dizziness, visual changes, slurred speech, facial droop, confusion, weakness, numbness, or other severe concerning symptoms that brought you to the hospital in the first place    Follow Up: Follow-up Information     Follow up With Specialties Details Why Contact Info    Mariel Xiong MD    Patient can only remember the practice name and not the physician      [facility]  In 2 weeks  [address] [facility]  [phone]    Trixie Leigh MD Endovascular Surgical Neuroradiology  for internal carotid stenosis [address] [facility] [address] [phone]              Information obtained by :  I understand that if any problems occur once I am at home I am to contact my physician. I understand and acknowledge receipt of the instructions indicated above. Physician's or R.N.'s Signature                                                                  Date/Time                                                                                                                                              Patient or Representative Signature                                                          Date/Time
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
